# Patient Record
Sex: FEMALE | NOT HISPANIC OR LATINO | Employment: FULL TIME | URBAN - METROPOLITAN AREA
[De-identification: names, ages, dates, MRNs, and addresses within clinical notes are randomized per-mention and may not be internally consistent; named-entity substitution may affect disease eponyms.]

---

## 2024-06-12 ENCOUNTER — TELEPHONE (OUTPATIENT)
Dept: ENDOCRINOLOGY | Facility: CLINIC | Age: 59
End: 2024-06-12
Payer: COMMERCIAL

## 2024-06-12 NOTE — TELEPHONE ENCOUNTER
Caller: VALERY [facility]    Relationship:     Best call back number: [phone]     What was the call regarding: VALERY AT [facility] IS CALLING TO SEE IF DR. MORRISON WOULD GO AHEAD AND LOOK AT THE US OF THYROID THAT PT HAD DONE ON 05/15/24. PLEASE CALL VALERY BACK AND LET HER KNOW IF ITS OKAY TO WAIT UNTIL SEPT FOR HER APPT SINCE THEY MIGHT NEED BIOPSIES.